# Patient Record
Sex: MALE | Race: WHITE | NOT HISPANIC OR LATINO | URBAN - METROPOLITAN AREA
[De-identification: names, ages, dates, MRNs, and addresses within clinical notes are randomized per-mention and may not be internally consistent; named-entity substitution may affect disease eponyms.]

---

## 2018-11-12 ENCOUNTER — APPOINTMENT (RX ONLY)
Dept: URBAN - METROPOLITAN AREA CLINIC 349 | Facility: CLINIC | Age: 38
Setting detail: DERMATOLOGY
End: 2018-11-12

## 2018-11-12 DIAGNOSIS — D22 MELANOCYTIC NEVI: ICD-10-CM

## 2018-11-12 DIAGNOSIS — L663 OTHER SPECIFIED DISEASES OF HAIR AND HAIR FOLLICLES: ICD-10-CM

## 2018-11-12 DIAGNOSIS — L73.9 FOLLICULAR DISORDER, UNSPECIFIED: ICD-10-CM

## 2018-11-12 DIAGNOSIS — D18.0 HEMANGIOMA: ICD-10-CM

## 2018-11-12 DIAGNOSIS — L738 OTHER SPECIFIED DISEASES OF HAIR AND HAIR FOLLICLES: ICD-10-CM

## 2018-11-12 DIAGNOSIS — Z71.89 OTHER SPECIFIED COUNSELING: ICD-10-CM

## 2018-11-12 PROBLEM — D22.61 MELANOCYTIC NEVI OF RIGHT UPPER LIMB, INCLUDING SHOULDER: Status: ACTIVE | Noted: 2018-11-12

## 2018-11-12 PROBLEM — L02.92 FURUNCLE, UNSPECIFIED: Status: ACTIVE | Noted: 2018-11-12

## 2018-11-12 PROBLEM — D18.01 HEMANGIOMA OF SKIN AND SUBCUTANEOUS TISSUE: Status: ACTIVE | Noted: 2018-11-12

## 2018-11-12 PROBLEM — D22.62 MELANOCYTIC NEVI OF LEFT UPPER LIMB, INCLUDING SHOULDER: Status: ACTIVE | Noted: 2018-11-12

## 2018-11-12 PROBLEM — D22.5 MELANOCYTIC NEVI OF TRUNK: Status: ACTIVE | Noted: 2018-11-12

## 2018-11-12 PROBLEM — F32.9 MAJOR DEPRESSIVE DISORDER, SINGLE EPISODE, UNSPECIFIED: Status: ACTIVE | Noted: 2018-11-12

## 2018-11-12 PROCEDURE — ? EDUCATIONAL RESOURCES PROVIDED

## 2018-11-12 PROCEDURE — 99242 OFF/OP CONSLTJ NEW/EST SF 20: CPT

## 2018-11-12 PROCEDURE — ? COUNSELING

## 2018-11-12 PROCEDURE — ? OTHER

## 2018-11-12 ASSESSMENT — LOCATION SIMPLE DESCRIPTION DERM
LOCATION SIMPLE: RIGHT UPPER BACK
LOCATION SIMPLE: LEFT LOWER BACK
LOCATION SIMPLE: CHEST
LOCATION SIMPLE: LEFT SHOULDER
LOCATION SIMPLE: ABDOMEN
LOCATION SIMPLE: RIGHT UPPER ARM
LOCATION SIMPLE: CHIN
LOCATION SIMPLE: LEFT UPPER BACK
LOCATION SIMPLE: LEFT UPPER ARM
LOCATION SIMPLE: RIGHT SHOULDER

## 2018-11-12 ASSESSMENT — LOCATION ZONE DERM
LOCATION ZONE: TRUNK
LOCATION ZONE: FACE
LOCATION ZONE: ARM

## 2018-11-12 ASSESSMENT — LOCATION DETAILED DESCRIPTION DERM
LOCATION DETAILED: RIGHT LATERAL INFERIOR CHEST
LOCATION DETAILED: LEFT SUPERIOR LATERAL LOWER BACK
LOCATION DETAILED: RIGHT SUPERIOR UPPER BACK
LOCATION DETAILED: LEFT MENTAL CREASE
LOCATION DETAILED: LEFT POSTERIOR SHOULDER
LOCATION DETAILED: LEFT LATERAL INFERIOR CHEST
LOCATION DETAILED: RIGHT POSTERIOR SHOULDER
LOCATION DETAILED: RIGHT MEDIAL SUPERIOR CHEST
LOCATION DETAILED: LEFT INFERIOR UPPER BACK
LOCATION DETAILED: RIGHT ANTERIOR DISTAL UPPER ARM
LOCATION DETAILED: LEFT ANTERIOR PROXIMAL UPPER ARM
LOCATION DETAILED: LEFT LATERAL UPPER BACK
LOCATION DETAILED: EPIGASTRIC SKIN

## 2018-11-12 NOTE — HPI: SKIN LESIONS
How Severe Is Your Skin Lesion?: mild
Have Your Skin Lesions Been Treated?: not been treated
Is This A New Presentation, Or A Follow-Up?: Skin Lesions
Which Family Member (Optional)?: Grandfather- pt states his grandfather has had a history of skin cancer and isn’t sure if it was melanoma.

## 2018-11-12 NOTE — PROCEDURE: OTHER
Detail Level: Zone
Other (Free Text): Gave sample of [place] to spot treat. \\n\\nIf it recurs it could be a cyst. We will monitor and possibly excise in future.
Note Text (......Xxx Chief Complaint.): This diagnosis correlates with the

## 2019-11-11 ENCOUNTER — APPOINTMENT (RX ONLY)
Dept: URBAN - METROPOLITAN AREA CLINIC 349 | Facility: CLINIC | Age: 39
Setting detail: DERMATOLOGY
End: 2019-11-11

## 2019-11-11 DIAGNOSIS — L73.8 OTHER SPECIFIED FOLLICULAR DISORDERS: ICD-10-CM

## 2019-11-11 DIAGNOSIS — Z71.89 OTHER SPECIFIED COUNSELING: ICD-10-CM

## 2019-11-11 DIAGNOSIS — D22 MELANOCYTIC NEVI: ICD-10-CM

## 2019-11-11 PROBLEM — D22.72 MELANOCYTIC NEVI OF LEFT LOWER LIMB, INCLUDING HIP: Status: ACTIVE | Noted: 2019-11-11

## 2019-11-11 PROBLEM — D22.5 MELANOCYTIC NEVI OF TRUNK: Status: ACTIVE | Noted: 2019-11-11

## 2019-11-11 PROCEDURE — ? EDUCATIONAL RESOURCES PROVIDED

## 2019-11-11 PROCEDURE — 99213 OFFICE O/P EST LOW 20 MIN: CPT

## 2019-11-11 PROCEDURE — ? OTHER

## 2019-11-11 PROCEDURE — ? DERMATOSCOPIC EVALUATION

## 2019-11-11 PROCEDURE — ? COUNSELING

## 2019-11-11 PROCEDURE — ? OBSERVATION

## 2019-11-11 ASSESSMENT — LOCATION SIMPLE DESCRIPTION DERM
LOCATION SIMPLE: LEFT FOREHEAD
LOCATION SIMPLE: LEFT PRETIBIAL REGION
LOCATION SIMPLE: RIGHT UPPER BACK
LOCATION SIMPLE: LEFT THIGH
LOCATION SIMPLE: LEFT UPPER ARM

## 2019-11-11 ASSESSMENT — LOCATION ZONE DERM
LOCATION ZONE: FACE
LOCATION ZONE: ARM
LOCATION ZONE: LEG
LOCATION ZONE: TRUNK

## 2019-11-11 ASSESSMENT — LOCATION DETAILED DESCRIPTION DERM
LOCATION DETAILED: LEFT DISTAL PRETIBIAL REGION
LOCATION DETAILED: RIGHT INFERIOR MEDIAL UPPER BACK
LOCATION DETAILED: LEFT ANTERIOR DISTAL THIGH
LOCATION DETAILED: LEFT PROXIMAL POSTERIOR UPPER ARM
LOCATION DETAILED: LEFT MEDIAL FOREHEAD

## 2019-11-11 NOTE — HPI: SKIN LESIONS
How Severe Is Your Skin Lesion?: mild
Have Your Skin Lesions Been Treated?: not been treated
Is This A New Presentation, Or A Follow-Up?: Skin Lesions
Additional History: Patient is here today for full body skin check. Patient denies anything new or changing. Patient denies any knowledge of any family history or personal history of skin cancers.

## 2019-11-11 NOTE — PROCEDURE: OTHER
Other (Free Text): Patient stated lesion on left lower leg has been present for at least ten years with no changes. Reticular pattern noted under dermatoscope. Photos were taken and patient is okay with observing lesion in three months rather than performing a biopsy today.
Detail Level: Zone
Note Text (......Xxx Chief Complaint.): This diagnosis correlates with the

## 2020-02-11 ENCOUNTER — APPOINTMENT (RX ONLY)
Dept: URBAN - METROPOLITAN AREA CLINIC 349 | Facility: CLINIC | Age: 40
Setting detail: DERMATOLOGY
End: 2020-02-11

## 2020-02-11 DIAGNOSIS — D22 MELANOCYTIC NEVI: ICD-10-CM | Status: STABLE

## 2020-02-11 PROBLEM — D22.72 MELANOCYTIC NEVI OF LEFT LOWER LIMB, INCLUDING HIP: Status: ACTIVE | Noted: 2020-02-11

## 2020-02-11 PROCEDURE — ? COUNSELING

## 2020-02-11 PROCEDURE — 99213 OFFICE O/P EST LOW 20 MIN: CPT

## 2020-02-11 PROCEDURE — ? OTHER

## 2020-02-11 ASSESSMENT — LOCATION SIMPLE DESCRIPTION DERM: LOCATION SIMPLE: LEFT PRETIBIAL REGION

## 2020-02-11 ASSESSMENT — LOCATION ZONE DERM: LOCATION ZONE: LEG

## 2020-02-11 ASSESSMENT — LOCATION DETAILED DESCRIPTION DERM: LOCATION DETAILED: LEFT DISTAL PRETIBIAL REGION

## 2020-02-11 NOTE — PROCEDURE: OTHER
Note Text (......Xxx Chief Complaint.): This diagnosis correlates with the
Other (Free Text): Patient is here to have lesion on leg observed. Patient denies any changes. \\n\\nCompared to photo. Lesions appears stable, no biopsy needed. Will continue to monitor at annual skin checks.
Detail Level: Zone

## 2021-03-16 ENCOUNTER — APPOINTMENT (RX ONLY)
Dept: URBAN - METROPOLITAN AREA CLINIC 349 | Facility: CLINIC | Age: 41
Setting detail: DERMATOLOGY
End: 2021-03-16

## 2021-03-16 DIAGNOSIS — Z12.83 ENCOUNTER FOR SCREENING FOR MALIGNANT NEOPLASM OF SKIN: ICD-10-CM

## 2021-03-16 DIAGNOSIS — D18.0 HEMANGIOMA: ICD-10-CM

## 2021-03-16 DIAGNOSIS — Z71.89 OTHER SPECIFIED COUNSELING: ICD-10-CM

## 2021-03-16 DIAGNOSIS — Z80.8 FAMILY HISTORY OF MALIGNANT NEOPLASM OF OTHER ORGANS OR SYSTEMS: ICD-10-CM

## 2021-03-16 DIAGNOSIS — D22 MELANOCYTIC NEVI: ICD-10-CM

## 2021-03-16 PROBLEM — D22.71 MELANOCYTIC NEVI OF RIGHT LOWER LIMB, INCLUDING HIP: Status: ACTIVE | Noted: 2021-03-16

## 2021-03-16 PROBLEM — L70.0 ACNE VULGARIS: Status: ACTIVE | Noted: 2021-03-16

## 2021-03-16 PROBLEM — I25.10 ATHEROSCLEROTIC HEART DISEASE OF NATIVE CORONARY ARTERY WITHOUT ANGINA PECTORIS: Status: ACTIVE | Noted: 2021-03-16

## 2021-03-16 PROBLEM — D22.5 MELANOCYTIC NEVI OF TRUNK: Status: ACTIVE | Noted: 2021-03-16

## 2021-03-16 PROBLEM — F41.9 ANXIETY DISORDER, UNSPECIFIED: Status: ACTIVE | Noted: 2021-03-16

## 2021-03-16 PROBLEM — L23.7 ALLERGIC CONTACT DERMATITIS DUE TO PLANTS, EXCEPT FOOD: Status: ACTIVE | Noted: 2021-03-16

## 2021-03-16 PROBLEM — L85.3 XEROSIS CUTIS: Status: ACTIVE | Noted: 2021-03-16

## 2021-03-16 PROBLEM — D22.62 MELANOCYTIC NEVI OF LEFT UPPER LIMB, INCLUDING SHOULDER: Status: ACTIVE | Noted: 2021-03-16

## 2021-03-16 PROBLEM — K21.9 GASTRO-ESOPHAGEAL REFLUX DISEASE WITHOUT ESOPHAGITIS: Status: ACTIVE | Noted: 2021-03-16

## 2021-03-16 PROBLEM — D22.72 MELANOCYTIC NEVI OF LEFT LOWER LIMB, INCLUDING HIP: Status: ACTIVE | Noted: 2021-03-16

## 2021-03-16 PROBLEM — D18.01 HEMANGIOMA OF SKIN AND SUBCUTANEOUS TISSUE: Status: ACTIVE | Noted: 2021-03-16

## 2021-03-16 PROBLEM — D22.61 MELANOCYTIC NEVI OF RIGHT UPPER LIMB, INCLUDING SHOULDER: Status: ACTIVE | Noted: 2021-03-16

## 2021-03-16 PROCEDURE — ? MEDICAL PHOTOGRAPHY REVIEW

## 2021-03-16 PROCEDURE — ? COUNSELING

## 2021-03-16 PROCEDURE — 99213 OFFICE O/P EST LOW 20 MIN: CPT

## 2021-03-16 ASSESSMENT — LOCATION SIMPLE DESCRIPTION DERM
LOCATION SIMPLE: ABDOMEN
LOCATION SIMPLE: LEFT THIGH
LOCATION SIMPLE: LEFT FOREARM
LOCATION SIMPLE: LEFT UPPER BACK
LOCATION SIMPLE: LEFT SHOULDER
LOCATION SIMPLE: LEFT LOWER BACK
LOCATION SIMPLE: RIGHT KNEE
LOCATION SIMPLE: LEFT CLAVICULAR SKIN
LOCATION SIMPLE: RIGHT SHOULDER
LOCATION SIMPLE: LEFT UPPER ARM
LOCATION SIMPLE: RIGHT UPPER BACK
LOCATION SIMPLE: RIGHT FOREARM

## 2021-03-16 ASSESSMENT — LOCATION DETAILED DESCRIPTION DERM
LOCATION DETAILED: RIGHT KNEE
LOCATION DETAILED: XIPHOID
LOCATION DETAILED: RIGHT POSTERIOR SHOULDER
LOCATION DETAILED: LEFT PROXIMAL POSTERIOR UPPER ARM
LOCATION DETAILED: RIGHT SUPERIOR MEDIAL UPPER BACK
LOCATION DETAILED: LEFT SUPERIOR LATERAL MIDBACK
LOCATION DETAILED: LEFT CLAVICULAR SKIN
LOCATION DETAILED: LEFT PROXIMAL DORSAL FOREARM
LOCATION DETAILED: LEFT ANTERIOR PROXIMAL THIGH
LOCATION DETAILED: LEFT SUPERIOR LATERAL UPPER BACK
LOCATION DETAILED: RIGHT PROXIMAL DORSAL FOREARM
LOCATION DETAILED: LEFT POSTERIOR SHOULDER

## 2021-03-16 ASSESSMENT — LOCATION ZONE DERM
LOCATION ZONE: TRUNK
LOCATION ZONE: ARM
LOCATION ZONE: LEG

## 2021-03-16 NOTE — HPI: SKIN LESION
How Severe Is Your Skin Lesion?: mild
Has Your Skin Lesion Been Treated?: not been treated
Is This A New Presentation, Or A Follow-Up?: Skin Lesion
Which Family Member (Optional)?: Grandfather
Additional History: Patient would like a full skin examination. Patient denies anything new or changing

## 2022-03-16 ENCOUNTER — APPOINTMENT (RX ONLY)
Dept: URBAN - METROPOLITAN AREA CLINIC 329 | Facility: CLINIC | Age: 42
Setting detail: DERMATOLOGY
End: 2022-03-16

## 2022-03-16 DIAGNOSIS — L82.1 OTHER SEBORRHEIC KERATOSIS: ICD-10-CM

## 2022-03-16 DIAGNOSIS — L73.9 FOLLICULAR DISORDER, UNSPECIFIED: ICD-10-CM | Status: INADEQUATELY CONTROLLED

## 2022-03-16 DIAGNOSIS — L663 OTHER SPECIFIED DISEASES OF HAIR AND HAIR FOLLICLES: ICD-10-CM | Status: INADEQUATELY CONTROLLED

## 2022-03-16 DIAGNOSIS — Z71.89 OTHER SPECIFIED COUNSELING: ICD-10-CM

## 2022-03-16 DIAGNOSIS — D22 MELANOCYTIC NEVI: ICD-10-CM

## 2022-03-16 DIAGNOSIS — L738 OTHER SPECIFIED DISEASES OF HAIR AND HAIR FOLLICLES: ICD-10-CM | Status: INADEQUATELY CONTROLLED

## 2022-03-16 DIAGNOSIS — Z80.8 FAMILY HISTORY OF MALIGNANT NEOPLASM OF OTHER ORGANS OR SYSTEMS: ICD-10-CM

## 2022-03-16 DIAGNOSIS — D18.0 HEMANGIOMA: ICD-10-CM

## 2022-03-16 PROBLEM — D22.5 MELANOCYTIC NEVI OF TRUNK: Status: ACTIVE | Noted: 2022-03-16

## 2022-03-16 PROBLEM — D22.72 MELANOCYTIC NEVI OF LEFT LOWER LIMB, INCLUDING HIP: Status: ACTIVE | Noted: 2022-03-16

## 2022-03-16 PROBLEM — D18.01 HEMANGIOMA OF SKIN AND SUBCUTANEOUS TISSUE: Status: ACTIVE | Noted: 2022-03-16

## 2022-03-16 PROBLEM — L02.223 FURUNCLE OF CHEST WALL: Status: ACTIVE | Noted: 2022-03-16

## 2022-03-16 PROCEDURE — ? FULL BODY SKIN EXAM

## 2022-03-16 PROCEDURE — ? COUNSELING

## 2022-03-16 PROCEDURE — ? MEDICAL PHOTOGRAPHY REVIEW

## 2022-03-16 PROCEDURE — ? PRESCRIPTION

## 2022-03-16 PROCEDURE — 99213 OFFICE O/P EST LOW 20 MIN: CPT

## 2022-03-16 PROCEDURE — ? TREATMENT REGIMEN

## 2022-03-16 PROCEDURE — ? PRESCRIPTION MEDICATION MANAGEMENT

## 2022-03-16 PROCEDURE — ? ADDITIONAL NOTES

## 2022-03-16 RX ORDER — CLINDAMYCIN PHOSPHATE 10 MG/ML
SOLUTION TOPICAL
Qty: 60 | Refills: 1 | Status: ERX | COMMUNITY
Start: 2022-03-16

## 2022-03-16 RX ADMIN — CLINDAMYCIN PHOSPHATE: 10 SOLUTION TOPICAL at 00:00

## 2022-03-16 ASSESSMENT — LOCATION DETAILED DESCRIPTION DERM
LOCATION DETAILED: LEFT LATERAL DISTAL PRETIBIAL REGION
LOCATION DETAILED: RIGHT SUPERIOR LATERAL UPPER BACK
LOCATION DETAILED: EPIGASTRIC SKIN
LOCATION DETAILED: RIGHT MID-UPPER BACK
LOCATION DETAILED: RIGHT POSTERIOR SHOULDER
LOCATION DETAILED: LEFT SUPERIOR MEDIAL UPPER BACK
LOCATION DETAILED: LEFT MEDIAL SUPERIOR CHEST

## 2022-03-16 ASSESSMENT — LOCATION SIMPLE DESCRIPTION DERM
LOCATION SIMPLE: LEFT PRETIBIAL REGION
LOCATION SIMPLE: RIGHT UPPER BACK
LOCATION SIMPLE: CHEST
LOCATION SIMPLE: RIGHT SHOULDER
LOCATION SIMPLE: LEFT UPPER BACK
LOCATION SIMPLE: ABDOMEN

## 2022-03-16 ASSESSMENT — LOCATION ZONE DERM
LOCATION ZONE: ARM
LOCATION ZONE: LEG
LOCATION ZONE: TRUNK

## 2022-03-16 NOTE — HPI: EVALUATION OF SKIN LESION(S)
Hpi Title: Evaluation of Skin Lesions
How Severe Are Your Spot(S)?: mild
Have Your Spot(S) Been Treated In The Past?: has not been treated
Additional History: Patient states his wife noticed a new spot on his chest yesterday. He denies pain, itching or bleeding.

## 2022-03-16 NOTE — PROCEDURE: PRESCRIPTION MEDICATION MANAGEMENT
Initiate Treatment: Clindamycin sol apply bid to affected area until resolved
Detail Level: Zone
Render In Strict Bullet Format?: No

## 2022-03-18 PROBLEM — E29.1 HYPOGONADISM MALE: Status: ACTIVE | Noted: 2017-02-27

## 2022-03-19 PROBLEM — N52.9 ERECTILE DYSFUNCTION: Status: ACTIVE | Noted: 2021-03-08

## 2022-03-19 PROBLEM — K21.00 GASTROESOPHAGEAL REFLUX DISEASE WITH ESOPHAGITIS: Status: ACTIVE | Noted: 2019-05-24

## 2022-03-19 PROBLEM — E78.2 ELEVATED TRIGLYCERIDES WITH HIGH CHOLESTEROL: Status: ACTIVE | Noted: 2021-03-08

## 2022-03-20 PROBLEM — J04.0 LARYNGITIS: Status: ACTIVE | Noted: 2017-02-08

## 2022-06-06 ENCOUNTER — TELEPHONE (OUTPATIENT)
Dept: PRIMARY CARE CLINIC | Facility: CLINIC | Age: 42
End: 2022-06-06

## 2022-06-06 RX ORDER — PRAMIPEXOLE DIHYDROCHLORIDE 0.25 MG/1
0.25 TABLET ORAL 3 TIMES DAILY
Qty: 90 TABLET | Refills: 1 | Status: SHIPPED | OUTPATIENT
Start: 2022-06-06 | End: 2022-06-21 | Stop reason: SDUPTHER

## 2022-06-06 RX ORDER — PRAMIPEXOLE DIHYDROCHLORIDE 0.25 MG/1
TABLET ORAL
Qty: 90 TABLET | Refills: 0 | OUTPATIENT
Start: 2022-06-06

## 2022-06-06 NOTE — TELEPHONE ENCOUNTER
Patient called and stated that he requested medication refills last week and he has not received. Please call him at [phone].

## 2022-06-21 ENCOUNTER — TELEMEDICINE (OUTPATIENT)
Dept: PRIMARY CARE CLINIC | Facility: CLINIC | Age: 42
End: 2022-06-21
Payer: COMMERCIAL

## 2022-06-21 DIAGNOSIS — F51.01 PRIMARY INSOMNIA: ICD-10-CM

## 2022-06-21 DIAGNOSIS — K21.00 GASTROESOPHAGEAL REFLUX DISEASE WITH ESOPHAGITIS WITHOUT HEMORRHAGE: ICD-10-CM

## 2022-06-21 DIAGNOSIS — E78.2 ELEVATED TRIGLYCERIDES WITH HIGH CHOLESTEROL: ICD-10-CM

## 2022-06-21 DIAGNOSIS — G25.81 RESTLESS LEG SYNDROME: Primary | ICD-10-CM

## 2022-06-21 DIAGNOSIS — J45.990 BRONCHOSPASM, EXERCISE-INDUCED: ICD-10-CM

## 2022-06-21 DIAGNOSIS — E29.1 HYPOGONADISM MALE: ICD-10-CM

## 2022-06-21 DIAGNOSIS — N52.9 ERECTILE DYSFUNCTION, UNSPECIFIED ERECTILE DYSFUNCTION TYPE: ICD-10-CM

## 2022-06-21 DIAGNOSIS — R79.89 LOW TESTOSTERONE IN MALE: ICD-10-CM

## 2022-06-21 PROCEDURE — 99214 OFFICE O/P EST MOD 30 MIN: CPT | Performed by: FAMILY MEDICINE

## 2022-06-21 RX ORDER — TADALAFIL 5 MG/1
5 TABLET ORAL PRN
Qty: 30 TABLET | Refills: 5 | Status: SHIPPED | OUTPATIENT
Start: 2022-06-21 | End: 2022-10-21

## 2022-06-21 RX ORDER — FAMOTIDINE 20 MG/1
20 TABLET, FILM COATED ORAL 2 TIMES DAILY
Qty: 180 TABLET | Refills: 3 | Status: SHIPPED | OUTPATIENT
Start: 2022-06-21

## 2022-06-21 RX ORDER — SILDENAFIL 100 MG/1
100 TABLET, FILM COATED ORAL PRN
Qty: 30 TABLET | Refills: 5 | Status: SHIPPED | OUTPATIENT
Start: 2022-06-21 | End: 2022-07-21

## 2022-06-21 RX ORDER — PRAMIPEXOLE DIHYDROCHLORIDE 0.25 MG/1
0.25 TABLET ORAL 3 TIMES DAILY
Qty: 270 TABLET | Refills: 1 | Status: SHIPPED | OUTPATIENT
Start: 2022-06-21

## 2022-06-21 RX ORDER — TEMAZEPAM 7.5 MG/1
7.5 CAPSULE ORAL NIGHTLY PRN
Qty: 90 CAPSULE | Refills: 1 | Status: SHIPPED | OUTPATIENT
Start: 2022-06-21 | End: 2022-07-21

## 2022-06-21 RX ORDER — ESOMEPRAZOLE MAGNESIUM 40 MG/1
CAPSULE, DELAYED RELEASE ORAL
Qty: 90 CAPSULE | Refills: 0 | OUTPATIENT
Start: 2022-06-21

## 2022-06-21 RX ORDER — MAGNESIUM OXIDE 400 MG/1
400 TABLET ORAL DAILY
Qty: 30 TABLET | Refills: 1 | Status: SHIPPED | OUTPATIENT
Start: 2022-06-21

## 2022-06-21 RX ORDER — ESOMEPRAZOLE MAGNESIUM 40 MG/1
40 CAPSULE, DELAYED RELEASE ORAL DAILY
Qty: 90 CAPSULE | Refills: 3 | Status: SHIPPED | OUTPATIENT
Start: 2022-06-21

## 2022-06-21 RX ORDER — TEMAZEPAM 7.5 MG/1
CAPSULE ORAL
Qty: 30 CAPSULE | Refills: 0 | OUTPATIENT
Start: 2022-06-21

## 2022-06-21 RX ORDER — FENOFIBRATE 145 MG/1
145 TABLET, COATED ORAL DAILY
Qty: 90 TABLET | Refills: 3 | Status: SHIPPED | OUTPATIENT
Start: 2022-06-21

## 2022-06-21 NOTE — PROGRESS NOTES
Aguilar Streeter (:  1980) is a Established patient, here for evaluation of the following:    Assessment & Plan   Below is the assessment and plan developed based on review of pertinent history, physical exam, labs, studies, and medications. 1. Restless leg syndrome  -     magnesium oxide (MAG-OX) 400 MG tablet; Take 1 tablet by mouth daily, Disp-30 tablet, R-1Normal  -     pramipexole (MIRAPEX) 0.25 MG tablet; Take 1 tablet by mouth 3 times daily, Disp-270 tablet, R-1Normal  2. Bronchospasm, exercise-induced  -     albuterol sulfate (PROAIR RESPICLICK) 869 (90 Base) MCG/ACT aerosol powder inhalation; Inhale 2 puffs into the lungs every 4 hours as needed for Wheezing, Disp-1 each, R-5Normal  3. Low testosterone in male  4. Primary insomnia  -     temazepam (RESTORIL) 7.5 MG capsule; Take 1 capsule by mouth nightly as needed for Sleep for up to 30 days. , Disp-90 capsule, R-1Normal  5. Gastroesophageal reflux disease with esophagitis without hemorrhage  -     esomeprazole (NEXIUM) 40 MG delayed release capsule; Take 1 capsule by mouth daily, Disp-90 capsule, R-3Normal  -     famotidine (PEPCID) 20 MG tablet; Take 1 tablet by mouth 2 times daily, Disp-180 tablet, R-3Normal  6. Hypogonadism male  7. Elevated triglycerides with high cholesterol  -     fenofibrate (TRICOR) 145 MG tablet; Take 1 tablet by mouth daily, Disp-90 tablet, R-3Normal  8. Erectile dysfunction, unspecified erectile dysfunction type  -     sildenafil (VIAGRA) 100 MG tablet; Take 1 tablet by mouth as needed for Erectile Dysfunction, Disp-30 tablet, R-5Normal  -     tadalafil (CIALIS) 5 MG tablet; Take 1 tablet by mouth as needed for Erectile Dysfunction TAKE 1 TABLET BY MOUTH ONCE DAILY AS NEEDED FOR ERECTILE DYSFUNCTION, Disp-30 tablet, R-5Normal    Return in about 3 months (around 2022), or total testosterone, lipid. , telemed, for for labs one week before appt. Subjective   HPI:  39 yo male who presents for follow up visit.   Pt reports he is doing overall pretty well, has been in [facility] to meet wife's family for first time. Pt says he was \"approved and well liked\" by her family. Pt says right before visit, he ran out of Testosterone, was not given until the next week the day before he left on trip, but now still has three doses, which will last him for about 10-11 weeks. Pt is advised we will schedule his appt to return for 3 months and hopefully he can avoid a \"last minute rush\" to the pharmacy. Pt says he would like also to discuss the restless legs symptoms. Pt says he also ran out of this medication, and is difficult without med, has been taking for 6-8 years. Pt is taking mirapex, 0.25mg tid dose, pt says takes one in pm and two at bedtime. Pt asks about alternative treatment an advised regarding mag-ox 400mg one or two daily, would like to try. However, the magnesium may not be as effective as the mirapex (dopamine axis of brain modulation controls restless legs), so is given refills on mirapex to use as prior. Pt advised magnesium may help reduce symptoms but it is questionable if will given adequate control. Pt has gotten tetanus booster, hepatitis vaccination information should be placed in chart. Pt will be heading to [facility] with wife on 21st of July for another vacation. The following medication refills are indicated today:  Albuterol sulfate for bronco spasm related to allergies, maintenance. Pt is given both nexium and pepcid to use for management of acid reflux (working appropriately. Pt continues to take tricor for cholesterol management (cholesterol 213 total, hdl 48, ldl 147). Other labs also normal, hgba1c 5.5%, non diabetic. Pt is given both viagra and cialis for management of erectile dysfunction, stable on current medication dose. Pt is given restoril for insomnia. Pt reports it is working satisfactorily. Pt is advised RTC in 3-6 months as may be indicated.     Review of Systems   Constitutional:        See HPI, medical concerns (as restless legs), medication review and refills given. HENT: Negative. History of allergies, mostly seasonal type. Eyes: Negative. Respiratory: Allergy related congestion, lung tightness   Cardiovascular: Negative. Gastrointestinal: Negative. Endocrine: Negative. Genitourinary: Negative. Musculoskeletal: Negative. Allergic/Immunologic: Negative. Neurological:        Restless leg syndrome   Hematological: Negative. Psychiatric/Behavioral: Positive for sleep disturbance.         Acute and general health concerns          Objective   Patient-Reported Vitals  No data recorded     [INSTRUCTIONS:  \"[x]\" Indicates a positive item  \"[]\" Indicates a negative item  -- DELETE ALL ITEMS NOT EXAMINED]    Constitutional: [x] Appears well-developed and well-nourished [] No apparent distress      [x] Abnormal - restless syndrome, other medical concerns, med refills indicated    Mental status: [x] Alert and awake  [x] Oriented to person/place/time [x] Able to follow commands    [] Abnormal -     Eyes:   EOM    [x]  Normal    [] Abnormal -   Sclera  [x]  Normal    [] Abnormal -          Discharge [x]  None visible   [] Abnormal -     HENT: [x] Normocephalic, atraumatic  [] Abnormal -   [x] Mouth/Throat: Mucous membranes are moist    External Ears [x] Normal  [] Abnormal -    Neck: [x] No visualized mass [] Abnormal -     Pulmonary/Chest: [x] Respiratory effort normal   [x] No visualized signs of difficulty breathing or respiratory distress        [] Abnormal -      Musculoskeletal:   [x] Normal gait with no signs of ataxia         [x] Normal range of motion of neck        [] Abnormal -     Neurological:        [x] No Facial Asymmetry (Cranial nerve 7 motor function) (limited exam due to video visit)          [x] No gaze palsy        [x] Abnormal -  Restless leg syndrome      Skin:        [x] No significant exanthematous lesions or discoloration noted on facial skin         [] Abnormal -            Psychiatric:       [x] Normal Affect [x] Abnormal - acute and general health concerns     [x] No Hallucinations    Other pertinent observable physical exam findings:-                 Winsome Grimm, was evaluated through a synchronous (real-time) audio-video encounter. The patient (or guardian if applicable) is aware that this is a billable service, which includes applicable co-pays. This Virtual Visit was conducted with patient's (and/or legal guardian's) consent. The visit was conducted pursuant to the emergency declaration under the [facility], [address] [place] [address] authority and the [employer] General Act. Patient identification was verified, and a caregiver was present when appropriate. The patient was located at Home: [address]. Provider was located at [facility] ([address] [place] [address]): Krishan [facility]  [facility]         --Lawyer Corie MD

## 2022-06-22 ASSESSMENT — ENCOUNTER SYMPTOMS
ALLERGIC/IMMUNOLOGIC NEGATIVE: 1
GASTROINTESTINAL NEGATIVE: 1
EYES NEGATIVE: 1

## 2022-08-12 RX ORDER — ALBUTEROL SULFATE 90 UG/1
2 AEROSOL, METERED RESPIRATORY (INHALATION) 4 TIMES DAILY PRN
Qty: 54 G | Refills: 1 | Status: SHIPPED | OUTPATIENT
Start: 2022-08-12

## 2022-09-21 RX ORDER — TESTOSTERONE CYPIONATE 200 MG/ML
INJECTION INTRAMUSCULAR
Qty: 10 ML | Refills: 0 | OUTPATIENT
Start: 2022-09-21

## 2022-10-04 ENCOUNTER — TELEPHONE (OUTPATIENT)
Dept: PRIMARY CARE CLINIC | Facility: CLINIC | Age: 42
End: 2022-10-04

## 2022-10-04 NOTE — TELEPHONE ENCOUNTER
Pt requesting refill of    Testosterone  18 and 23 gauge needles    Send to [facility] on [address]

## 2022-10-04 NOTE — TELEPHONE ENCOUNTER
Medication Refill  (Newest Message First)  Vera Crespo routed conversation to You 3 hours ago (2:45 PM)     Vera Crespo 3 hours ago (2:45 PM)     DS  Pt requesting refill of     Testosterone  18 and 23 gauge needles     Send to [facility] on [address]         Note               Recent Patient Communication     Last Update Reason Specialty     Today Medication Refill Primary Care     [facility] [address] -- Primary Care     [facility] Primary [facility] [place], Generic Provider Open     1 week ago Medication Refill Primary Care     [facility] History    Priority Sent On From To Message Type    10/4/2022  2:45 PM [address] Patient Calls     Created by    Vera Crespo on 10/04/2022 02:44 PM

## 2022-10-05 DIAGNOSIS — E29.1 HYPOGONADISM MALE: Primary | ICD-10-CM

## 2022-10-05 RX ORDER — TESTOSTERONE CYPIONATE 200 MG/ML
INJECTION INTRAMUSCULAR
Qty: 6 ML | Refills: 0 | Status: SHIPPED | OUTPATIENT
Start: 2022-10-05 | End: 2022-12-05

## 2022-11-02 ENCOUNTER — TELEPHONE (OUTPATIENT)
Dept: PRIMARY CARE CLINIC | Facility: CLINIC | Age: 42
End: 2022-11-02

## 2022-11-23 DIAGNOSIS — G25.81 RESTLESS LEG SYNDROME: ICD-10-CM

## 2022-12-12 DIAGNOSIS — N52.9 ERECTILE DYSFUNCTION, UNSPECIFIED ERECTILE DYSFUNCTION TYPE: ICD-10-CM

## 2022-12-29 ENCOUNTER — TELEPHONE (OUTPATIENT)
Dept: PRIMARY CARE CLINIC | Facility: CLINIC | Age: 42
End: 2022-12-29

## 2022-12-29 DIAGNOSIS — N52.9 ERECTILE DYSFUNCTION, UNSPECIFIED ERECTILE DYSFUNCTION TYPE: ICD-10-CM

## 2022-12-29 DIAGNOSIS — F51.01 PRIMARY INSOMNIA: ICD-10-CM

## 2022-12-29 RX ORDER — TEMAZEPAM 7.5 MG/1
7.5 CAPSULE ORAL NIGHTLY PRN
Qty: 90 CAPSULE | Refills: 1 | Status: CANCELLED | OUTPATIENT
Start: 2022-12-29 | End: 2023-01-28

## 2022-12-29 RX ORDER — TADALAFIL 5 MG/1
5 TABLET ORAL PRN
Qty: 30 TABLET | Refills: 5 | Status: CANCELLED | OUTPATIENT
Start: 2022-12-29 | End: 2023-04-30

## 2022-12-29 NOTE — TELEPHONE ENCOUNTER
Pt requesting a refill of    Temazepam  Tidalafil    Send to [facility] on [address]

## 2022-12-30 DIAGNOSIS — F51.01 PRIMARY INSOMNIA: ICD-10-CM

## 2022-12-30 DIAGNOSIS — N52.9 ERECTILE DYSFUNCTION, UNSPECIFIED ERECTILE DYSFUNCTION TYPE: ICD-10-CM

## 2022-12-30 RX ORDER — SILDENAFIL 100 MG/1
100 TABLET, FILM COATED ORAL PRN
Qty: 30 TABLET | Refills: 5 | Status: SHIPPED | OUTPATIENT
Start: 2022-12-30 | End: 2023-01-29

## 2022-12-30 RX ORDER — TEMAZEPAM 7.5 MG/1
7.5 CAPSULE ORAL NIGHTLY PRN
Qty: 90 CAPSULE | Refills: 0 | Status: SHIPPED | OUTPATIENT
Start: 2022-12-30 | End: 2023-01-29

## 2022-12-30 NOTE — PROGRESS NOTES
Restoril and viagra sent to pharmacy. Pt is given 3 months of restoril, due to being a controlled med, pt will require an appt for additional refills. Telemed is okay.

## 2023-01-11 DIAGNOSIS — G25.81 RESTLESS LEG SYNDROME: ICD-10-CM

## 2023-01-11 DIAGNOSIS — N52.9 ERECTILE DYSFUNCTION, UNSPECIFIED ERECTILE DYSFUNCTION TYPE: ICD-10-CM

## 2023-01-17 ENCOUNTER — TELEPHONE (OUTPATIENT)
Dept: PRIMARY CARE CLINIC | Facility: CLINIC | Age: 43
End: 2023-01-17

## 2023-01-17 NOTE — TELEPHONE ENCOUNTER
Pt requesting refills of    Magnesium oxide  Tadalafil    Send to [facility] in [facility]

## 2023-01-18 DIAGNOSIS — G25.81 RESTLESS LEG SYNDROME: ICD-10-CM

## 2023-01-18 DIAGNOSIS — N52.9 ERECTILE DYSFUNCTION, UNSPECIFIED ERECTILE DYSFUNCTION TYPE: ICD-10-CM

## 2023-01-18 RX ORDER — TADALAFIL 5 MG/1
5 TABLET ORAL PRN
Qty: 30 TABLET | Refills: 5 | Status: SHIPPED | OUTPATIENT
Start: 2023-01-18 | End: 2023-05-20

## 2023-01-18 RX ORDER — MAGNESIUM OXIDE 400 MG/1
400 TABLET ORAL DAILY
Qty: 30 TABLET | Refills: 1 | Status: SHIPPED | OUTPATIENT
Start: 2023-01-18

## 2023-02-21 RX ORDER — TADALAFIL 5 MG/1
TABLET ORAL
Qty: 30 TABLET | Refills: 0 | OUTPATIENT
Start: 2023-02-21

## 2023-02-21 RX ORDER — TADALAFIL 5 MG/1
5 TABLET ORAL PRN
Qty: 30 TABLET | Refills: 5 | OUTPATIENT
Start: 2023-02-21 | End: 2023-06-23

## 2023-02-21 RX ORDER — PRAMIPEXOLE DIHYDROCHLORIDE 0.25 MG/1
TABLET ORAL
Qty: 270 TABLET | Refills: 0 | OUTPATIENT
Start: 2023-02-21

## 2023-02-21 RX ORDER — MAGNESIUM OXIDE 400 MG/1
400 TABLET ORAL DAILY
Qty: 30 TABLET | Refills: 1 | OUTPATIENT
Start: 2023-02-21

## 2023-02-27 DIAGNOSIS — E29.1 HYPOGONADISM MALE: ICD-10-CM

## 2023-02-27 DIAGNOSIS — N52.9 ERECTILE DYSFUNCTION, UNSPECIFIED ERECTILE DYSFUNCTION TYPE: ICD-10-CM

## 2023-02-27 RX ORDER — SILDENAFIL 100 MG/1
100 TABLET, FILM COATED ORAL PRN
Qty: 30 TABLET | Refills: 5 | OUTPATIENT
Start: 2023-02-27 | End: 2023-03-29

## 2023-02-27 RX ORDER — TESTOSTERONE CYPIONATE 200 MG/ML
INJECTION INTRAMUSCULAR
Qty: 6 ML | Refills: 0 | OUTPATIENT
Start: 2023-02-27 | End: 2023-04-29

## 2024-03-05 ENCOUNTER — NURSE ONLY (OUTPATIENT)
Dept: PRIMARY CARE CLINIC | Facility: CLINIC | Age: 44
End: 2024-03-05

## 2024-03-05 DIAGNOSIS — E29.1 HYPOGONADISM IN MALE: ICD-10-CM

## 2024-03-05 DIAGNOSIS — E78.5 HYPERLIPIDEMIA, UNSPECIFIED HYPERLIPIDEMIA TYPE: ICD-10-CM

## 2024-03-05 DIAGNOSIS — E29.1 HYPOGONADISM IN MALE: Primary | ICD-10-CM

## 2024-03-05 DIAGNOSIS — R53.83 FATIGUE, UNSPECIFIED TYPE: ICD-10-CM

## 2024-03-05 DIAGNOSIS — Z13.228 SCREENING FOR METABOLIC DISORDER: ICD-10-CM

## 2024-03-05 DIAGNOSIS — R79.9 ABNORMAL BLOOD CHEMISTRY: ICD-10-CM

## 2024-03-05 DIAGNOSIS — R73.01 IFG (IMPAIRED FASTING GLUCOSE): ICD-10-CM

## 2024-03-05 DIAGNOSIS — R94.6 NONSPECIFIC ABNORMAL RESULTS OF THYROID FUNCTION STUDY: ICD-10-CM

## 2024-03-05 LAB
ALBUMIN SERPL-MCNC: 3.9 G/DL (ref 3.5–5)
ALBUMIN/GLOB SERPL: 1.1 (ref 0.4–1.6)
ALP SERPL-CCNC: 49 U/L (ref 50–136)
ALT SERPL-CCNC: 38 U/L (ref 12–65)
ANION GAP SERPL CALC-SCNC: 6 MMOL/L (ref 2–11)
AST SERPL-CCNC: 25 U/L (ref 15–37)
BASOPHILS # BLD: 0.1 K/UL (ref 0–0.2)
BASOPHILS NFR BLD: 1 % (ref 0–2)
BILIRUB SERPL-MCNC: 1.2 MG/DL (ref 0.2–1.1)
BUN SERPL-MCNC: 16 MG/DL (ref 6–23)
CALCIUM SERPL-MCNC: 9.5 MG/DL (ref 8.3–10.4)
CHLORIDE SERPL-SCNC: 112 MMOL/L (ref 103–113)
CHOLEST SERPL-MCNC: 136 MG/DL
CO2 SERPL-SCNC: 25 MMOL/L (ref 21–32)
CREAT SERPL-MCNC: 1 MG/DL (ref 0.8–1.5)
DIFFERENTIAL METHOD BLD: NORMAL
EOSINOPHIL # BLD: 0.1 K/UL (ref 0–0.8)
EOSINOPHIL NFR BLD: 2 % (ref 0.5–7.8)
ERYTHROCYTE [DISTWIDTH] IN BLOOD BY AUTOMATED COUNT: 12.3 % (ref 11.9–14.6)
GLOBULIN SER CALC-MCNC: 3.4 G/DL (ref 2.8–4.5)
GLUCOSE SERPL-MCNC: 90 MG/DL (ref 65–100)
HCT VFR BLD AUTO: 46.9 % (ref 41.1–50.3)
HDLC SERPL-MCNC: 48 MG/DL (ref 40–60)
HDLC SERPL: 2.8
HGB BLD-MCNC: 15.3 G/DL (ref 13.6–17.2)
IMM GRANULOCYTES # BLD AUTO: 0 K/UL (ref 0–0.5)
IMM GRANULOCYTES NFR BLD AUTO: 0 % (ref 0–5)
LDLC SERPL CALC-MCNC: 67.4 MG/DL
LYMPHOCYTES # BLD: 2.6 K/UL (ref 0.5–4.6)
LYMPHOCYTES NFR BLD: 38 % (ref 13–44)
MCH RBC QN AUTO: 30.6 PG (ref 26.1–32.9)
MCHC RBC AUTO-ENTMCNC: 32.6 G/DL (ref 31.4–35)
MCV RBC AUTO: 93.8 FL (ref 82–102)
MONOCYTES # BLD: 0.6 K/UL (ref 0.1–1.3)
MONOCYTES NFR BLD: 9 % (ref 4–12)
NEUTS SEG # BLD: 3.5 K/UL (ref 1.7–8.2)
NEUTS SEG NFR BLD: 50 % (ref 43–78)
NRBC # BLD: 0 K/UL (ref 0–0.2)
PLATELET # BLD AUTO: 226 K/UL (ref 150–450)
PMV BLD AUTO: 11 FL (ref 9.4–12.3)
POTASSIUM SERPL-SCNC: 4.3 MMOL/L (ref 3.5–5.1)
PROT SERPL-MCNC: 7.3 G/DL (ref 6.3–8.2)
PSA SERPL-MCNC: 2.5 NG/ML
RBC # BLD AUTO: 5 M/UL (ref 4.23–5.6)
SODIUM SERPL-SCNC: 143 MMOL/L (ref 136–146)
T4 FREE SERPL-MCNC: 1 NG/DL (ref 0.78–1.46)
TRIGL SERPL-MCNC: 103 MG/DL (ref 35–150)
TSH, 3RD GENERATION: 1.73 UIU/ML (ref 0.36–3.74)
VLDLC SERPL CALC-MCNC: 20.6 MG/DL (ref 6–23)
WBC # BLD AUTO: 6.9 K/UL (ref 4.3–11.1)

## 2024-03-06 LAB
EST. AVERAGE GLUCOSE BLD GHB EST-MCNC: 120 MG/DL
HBA1C MFR BLD: 5.8 % (ref 4.8–5.6)

## 2024-03-06 NOTE — RESULT ENCOUNTER NOTE
A1c is elevated.  Usually diet and exercise changes will help with this level.  We can discuss additional details at the office visit on 03/12/2024  CMP is mostly unremarkable but similar to previous  T4 is normal  TSH is normal  Lipid panel is normal  PSA is normal  CBC is normal    Testosterone is still pending

## 2024-03-08 LAB
TESTOST FREE SERPL-MCNC: 6.9 PG/ML (ref 6.8–21.5)
TESTOST SERPL-MCNC: 435 NG/DL (ref 264–916)

## 2024-03-11 ASSESSMENT — PATIENT HEALTH QUESTIONNAIRE - PHQ9
7. TROUBLE CONCENTRATING ON THINGS, SUCH AS READING THE NEWSPAPER OR WATCHING TELEVISION: 0
10. IF YOU CHECKED OFF ANY PROBLEMS, HOW DIFFICULT HAVE THESE PROBLEMS MADE IT FOR YOU TO DO YOUR WORK, TAKE CARE OF THINGS AT HOME, OR GET ALONG WITH OTHER PEOPLE: 0
SUM OF ALL RESPONSES TO PHQ QUESTIONS 1-9: 3
SUM OF ALL RESPONSES TO PHQ9 QUESTIONS 1 & 2: 0
9. THOUGHTS THAT YOU WOULD BE BETTER OFF DEAD, OR OF HURTING YOURSELF: NOT AT ALL
2. FEELING DOWN, DEPRESSED OR HOPELESS: NOT AT ALL
8. MOVING OR SPEAKING SO SLOWLY THAT OTHER PEOPLE COULD HAVE NOTICED. OR THE OPPOSITE, BEING SO FIGETY OR RESTLESS THAT YOU HAVE BEEN MOVING AROUND A LOT MORE THAN USUAL: 0
2. FEELING DOWN, DEPRESSED OR HOPELESS: 0
SUM OF ALL RESPONSES TO PHQ QUESTIONS 1-9: 3
3. TROUBLE FALLING OR STAYING ASLEEP: 1
1. LITTLE INTEREST OR PLEASURE IN DOING THINGS: NOT AT ALL
3. TROUBLE FALLING OR STAYING ASLEEP: SEVERAL DAYS
9. THOUGHTS THAT YOU WOULD BE BETTER OFF DEAD, OR OF HURTING YOURSELF: 0
6. FEELING BAD ABOUT YOURSELF - OR THAT YOU ARE A FAILURE OR HAVE LET YOURSELF OR YOUR FAMILY DOWN: 0
7. TROUBLE CONCENTRATING ON THINGS, SUCH AS READING THE NEWSPAPER OR WATCHING TELEVISION: NOT AT ALL
8. MOVING OR SPEAKING SO SLOWLY THAT OTHER PEOPLE COULD HAVE NOTICED. OR THE OPPOSITE - BEING SO FIDGETY OR RESTLESS THAT YOU HAVE BEEN MOVING AROUND A LOT MORE THAN USUAL: NOT AT ALL
5. POOR APPETITE OR OVEREATING: 1
SUM OF ALL RESPONSES TO PHQ QUESTIONS 1-9: 3
4. FEELING TIRED OR HAVING LITTLE ENERGY: SEVERAL DAYS
10. IF YOU CHECKED OFF ANY PROBLEMS, HOW DIFFICULT HAVE THESE PROBLEMS MADE IT FOR YOU TO DO YOUR WORK, TAKE CARE OF THINGS AT HOME, OR GET ALONG WITH OTHER PEOPLE: NOT DIFFICULT AT ALL
6. FEELING BAD ABOUT YOURSELF - OR THAT YOU ARE A FAILURE OR HAVE LET YOURSELF OR YOUR FAMILY DOWN: NOT AT ALL
5. POOR APPETITE OR OVEREATING: SEVERAL DAYS
1. LITTLE INTEREST OR PLEASURE IN DOING THINGS: 0
4. FEELING TIRED OR HAVING LITTLE ENERGY: 1
SUM OF ALL RESPONSES TO PHQ QUESTIONS 1-9: 3
SUM OF ALL RESPONSES TO PHQ QUESTIONS 1-9: 3

## 2024-03-11 NOTE — RESULT ENCOUNTER NOTE
Please let the patient know:    Testosterone level is normal    MyChart message sent as well    Explanatory note: Be assured that the information provided to create your medical record comes from your provider. The written transcription portion of this note is prepared electronically by voice-recognition software. At times, there may be some errors in capitalization, punctuation, tense, or context that are inherent in the system, but your provider reviews the note for content and to ensure that the note contains appropriate information for your continuing care.

## 2024-03-12 ENCOUNTER — OFFICE VISIT (OUTPATIENT)
Dept: PRIMARY CARE CLINIC | Facility: CLINIC | Age: 44
End: 2024-03-12

## 2024-03-12 VITALS
TEMPERATURE: 98 F | DIASTOLIC BLOOD PRESSURE: 78 MMHG | WEIGHT: 212.2 LBS | OXYGEN SATURATION: 98 % | SYSTOLIC BLOOD PRESSURE: 113 MMHG | BODY MASS INDEX: 29.6 KG/M2 | HEART RATE: 71 BPM

## 2024-03-12 DIAGNOSIS — B07.9 WART OF HAND: ICD-10-CM

## 2024-03-12 DIAGNOSIS — E29.1 HYPOGONADISM MALE: ICD-10-CM

## 2024-03-12 DIAGNOSIS — G25.81 RESTLESS LEG SYNDROME: ICD-10-CM

## 2024-03-12 DIAGNOSIS — R79.89 LOW TESTOSTERONE IN MALE: ICD-10-CM

## 2024-03-12 DIAGNOSIS — N52.9 ERECTILE DYSFUNCTION, UNSPECIFIED ERECTILE DYSFUNCTION TYPE: ICD-10-CM

## 2024-03-12 DIAGNOSIS — E78.2 ELEVATED TRIGLYCERIDES WITH HIGH CHOLESTEROL: ICD-10-CM

## 2024-03-12 DIAGNOSIS — R73.03 PREDIABETES: Primary | ICD-10-CM

## 2024-03-12 DIAGNOSIS — K21.00 GASTROESOPHAGEAL REFLUX DISEASE WITH ESOPHAGITIS WITHOUT HEMORRHAGE: ICD-10-CM

## 2024-03-12 PROBLEM — J04.0 LARYNGITIS: Status: RESOLVED | Noted: 2017-02-08 | Resolved: 2024-03-12

## 2024-03-12 RX ORDER — ESOMEPRAZOLE MAGNESIUM 40 MG/1
40 CAPSULE, DELAYED RELEASE ORAL DAILY
Qty: 90 CAPSULE | Refills: 1 | Status: SHIPPED | OUTPATIENT
Start: 2024-03-12

## 2024-03-12 RX ORDER — ALBUTEROL SULFATE 90 UG/1
2 AEROSOL, METERED RESPIRATORY (INHALATION) 4 TIMES DAILY PRN
Qty: 54 G | Refills: 1 | Status: SHIPPED | OUTPATIENT
Start: 2024-03-12

## 2024-03-12 RX ORDER — PRAMIPEXOLE DIHYDROCHLORIDE 0.25 MG/1
0.25 TABLET ORAL 3 TIMES DAILY
Qty: 270 TABLET | Refills: 1 | Status: SHIPPED | OUTPATIENT
Start: 2024-03-12 | End: 2024-06-10

## 2024-03-12 RX ORDER — FAMOTIDINE 20 MG/1
20 TABLET, FILM COATED ORAL 2 TIMES DAILY
Qty: 180 TABLET | Refills: 1 | Status: SHIPPED | OUTPATIENT
Start: 2024-03-12

## 2024-03-12 RX ORDER — MAGNESIUM OXIDE 400 MG/1
400 TABLET ORAL DAILY
Qty: 90 TABLET | Refills: 1 | Status: SHIPPED | OUTPATIENT
Start: 2024-03-12 | End: 2024-06-10

## 2024-03-12 RX ORDER — FENOFIBRATE 145 MG/1
145 TABLET, COATED ORAL DAILY
Qty: 90 TABLET | Refills: 1 | Status: SHIPPED | OUTPATIENT
Start: 2024-03-12

## 2024-03-12 SDOH — ECONOMIC STABILITY: FOOD INSECURITY: WITHIN THE PAST 12 MONTHS, YOU WORRIED THAT YOUR FOOD WOULD RUN OUT BEFORE YOU GOT MONEY TO BUY MORE.: NEVER TRUE

## 2024-03-12 SDOH — ECONOMIC STABILITY: HOUSING INSECURITY
IN THE LAST 12 MONTHS, WAS THERE A TIME WHEN YOU DID NOT HAVE A STEADY PLACE TO SLEEP OR SLEPT IN A SHELTER (INCLUDING NOW)?: NO

## 2024-03-12 SDOH — ECONOMIC STABILITY: FOOD INSECURITY: WITHIN THE PAST 12 MONTHS, THE FOOD YOU BOUGHT JUST DIDN'T LAST AND YOU DIDN'T HAVE MONEY TO GET MORE.: NEVER TRUE

## 2024-03-12 SDOH — ECONOMIC STABILITY: INCOME INSECURITY: HOW HARD IS IT FOR YOU TO PAY FOR THE VERY BASICS LIKE FOOD, HOUSING, MEDICAL CARE, AND HEATING?: NOT HARD AT ALL

## 2024-03-12 ASSESSMENT — ENCOUNTER SYMPTOMS: RESPIRATORY NEGATIVE: 1

## 2024-03-12 NOTE — ASSESSMENT & PLAN NOTE
Hyperlipidemia    Chronic. Stable/Well-Controlled.  Labs: at least annually. Recent labs reviewed.  Non-laboratory testing: None indicated today.  Medication:  Given refill for Fenofibrate  Medication and dosage is unchanged today.  Discussed warning S&S r/t medication usage. Discussed SE, AR, AE.  F/u in 6 months for re-evaluation, unless an earlier f/u is required. If improvement is noted, we will provide additional refills.    Diet/Exercise/Lifestyle Changes  Encourage appropriate rest and fluid intake  Encourage lifestyle changes, including healthy eating, exercise, limiting alcohol/tobacco use, and stress reducers.  Dietary Considerations  Eat more grains (substitute quinoa or brown rice instead of potatoes or pasta)  Eat more nuts (unsalted)  Eat more omega 3 fats (2 servings per week)  Rociada, mackerel, herring, tuna, flax, walnuts, spinach, broccoli, edamame, omega supplements  Eat more applies, pears, oranges, and chidi  Use olive oil when possible  Olive oil is not good for high heat applications like frying  Try using canola oil with high heat applications  Can be mixed with red wine vinegar, minced garlic, and ground pepper to make salad dressing  Drizzle 3 tbsp olive oil over vegetables (carrots, leeks). Scatter on some herbs, cover with foil. Bake at 375 for 45 minutes.  Try different foods  Avocado  Tofu  Activity Changes:  Exercise - try to get at least 15 minutes of dedicated activity every day  Eat smaller portions  Things to limit or take out of your diet:  Avoid soda, baked goods, candies, cookies as much as possible (these have high sugar content)  Avoid french fries, crackers, cakes, chips as much as possible (these have high trans fat content)  Limit eggs (4 per week)  Limit saturated fats (meat, dairy, eggs)

## 2024-03-12 NOTE — ASSESSMENT & PLAN NOTE
Stable.  He has not taken testosterone supplementation in 7 months.  He will try over-the-counter/natural remedies if needed.  He has changed his diet and lifestyle changes recently.

## 2024-03-12 NOTE — ASSESSMENT & PLAN NOTE
GERD  Chronic. Stable/Well-Controlled  Labs: Mg, Vit B12 with annual labs. Recent labs reviewed.  Medication:  Given refills for Nexium and Pepcid.  Medication and dosage is unchanged today.  Discussed warning S&S r/t medication usage. Discussed SE, AR, AE.  Tolerating medication well. No SE, AR, AE. Benefits outweigh risks. Prefers to continue medication as currently prescribed.  Encourage appropriate rest and fluid intake  Encourage lifestyle changes, including healthy eating, exercise, limiting alcohol/tobacco use, and stress reducers.  F/u in 6 months for re-evaluation, unless an earlier f/u is required. If improvement is noted, we will provide additional refills.    General Measures  Elevate HOB  Avoid meals 2 to 3 hours before bedtime  Avoid stooping, bending, and tight-fitting garments  Avoid medications that relax LES (anticholinergic drugs; CCB)  Promoted weight loss  Avoid tobacco use and alcohol  Limit consumption of patient-specific food triggers  Track symptoms over time      No

## 2024-03-12 NOTE — PROGRESS NOTES
[facility] PHYSICIAN SERVICES  [facility] PRIMARY CARE  [address] [place] [address] [address] [place] [address]  Dept: [phone]       Patient: Swapnil Keys  YOB: 1980  Patient Age: 43 y.o.  Patient Sex: male  Medical Record: [id number]  Visit Date: 03/12/2024    Family Practice Clinic Note    Chief Complaint   Patient presents with    Annual Exam      Pt has a wart on his RT hand and middle finger that Pt wants looked at.       1. Presents to establish care  2. Review labs  3. Wart on skin    He is attempting to have a child with his wife. He does admit to having Dr Pepper daily recently while his wife was in [facility]. He reports that he will no longer be drinking sodas after seeing his A1C. He does also have a son.    Skin Problem  This is a chronic problem. The current episode started more than 1 year ago. Location: right middle finger. The rash is characterized by scaling. He was exposed to nothing. Pertinent negatives include no congestion. Treatments tried: OTC methods.        No Known Allergies    Current Outpatient Medications   Medication Sig Dispense Refill    magnesium oxide (MAG-OX) 400 MG tablet Take 1 tablet by mouth daily 90 tablet 1    albuterol sulfate HFA (VENTOLIN HFA) 108 (90 Base) MCG/ACT inhaler Inhale 2 puffs into the lungs 4 times daily as needed for Wheezing Generic please 54 g 1    fenofibrate (TRICOR) 145 MG tablet Take 1 tablet by mouth daily 90 tablet 1    pramipexole (MIRAPEX) 0.25 MG tablet Take 1 tablet by mouth 3 times daily 270 tablet 1    esomeprazole (NEXIUM) 40 MG delayed release capsule Take 1 capsule by mouth daily 90 capsule 1    famotidine (PEPCID) 20 MG tablet Take 1 tablet by mouth 2 times daily 180 tablet 1    Insulin Syringe-Needle U-100 29G X 1/2\" 0.3 ML MISC 1 each by Does not apply route daily Use for every 3 week testosterone injection 50 each 3    NEEDLE, DISP, 25 G 25G X 5/8\" MISC Use for every three week testosterone injection 50 each 5    tadalafil

## 2024-03-12 NOTE — ASSESSMENT & PLAN NOTE
Given refill for Mg and Mirapex    COMPLEMENTARY & ALTERNATIVE MEDICINE    Compression stockings, pneumatic compression devices for the legs  Noninvasive peripheral nerve stimulation  Enhanced external counterpulsation

## 2024-03-12 NOTE — ASSESSMENT & PLAN NOTE
Stable.  Liquid nitrogen applied today.  Discussed warning signs and symptoms.  Discussed proper hygiene and wound care.

## 2024-07-08 DIAGNOSIS — N52.9 ERECTILE DYSFUNCTION, UNSPECIFIED ERECTILE DYSFUNCTION TYPE: ICD-10-CM

## 2024-07-08 RX ORDER — LANOLIN ALCOHOL/MO/W.PET/CERES
400 CREAM (GRAM) TOPICAL DAILY
COMMUNITY
End: 2024-07-08 | Stop reason: SDUPTHER

## 2024-07-08 RX ORDER — SILDENAFIL 100 MG/1
100 TABLET, FILM COATED ORAL PRN
Qty: 30 TABLET | Refills: 2 | Status: SHIPPED | OUTPATIENT
Start: 2024-07-08 | End: 2024-08-07

## 2024-07-08 RX ORDER — LANOLIN ALCOHOL/MO/W.PET/CERES
400 CREAM (GRAM) TOPICAL DAILY
Qty: 30 TABLET | Refills: 2 | Status: SHIPPED | OUTPATIENT
Start: 2024-07-08

## 2024-07-08 RX ORDER — TADALAFIL 5 MG/1
5 TABLET ORAL PRN
Qty: 30 TABLET | Refills: 2 | Status: SHIPPED | OUTPATIENT
Start: 2024-07-08 | End: 2024-11-07